# Patient Record
Sex: FEMALE | Race: WHITE | NOT HISPANIC OR LATINO | URBAN - METROPOLITAN AREA
[De-identification: names, ages, dates, MRNs, and addresses within clinical notes are randomized per-mention and may not be internally consistent; named-entity substitution may affect disease eponyms.]

---

## 2024-12-11 ENCOUNTER — APPOINTMENT (OUTPATIENT)
Dept: DERMATOLOGY | Facility: CLINIC | Age: 84
End: 2024-12-11
Payer: MEDICARE

## 2024-12-11 VITALS — SYSTOLIC BLOOD PRESSURE: 182 MMHG | HEART RATE: 79 BPM | DIASTOLIC BLOOD PRESSURE: 78 MMHG

## 2024-12-11 DIAGNOSIS — C44.91 BASAL CELL CARCINOMA (BCC), UNSPECIFIED SITE: ICD-10-CM

## 2024-12-11 PROCEDURE — 14301 TIS TRNFR ANY 30.1-60 SQ CM: CPT | Performed by: DERMATOLOGY

## 2024-12-11 PROCEDURE — 17312 MOHS ADDL STAGE: CPT | Performed by: DERMATOLOGY

## 2024-12-11 PROCEDURE — 17311 MOHS 1 STAGE H/N/HF/G: CPT | Performed by: DERMATOLOGY

## 2024-12-11 RX ORDER — OMEPRAZOLE 40 MG/1
40 CAPSULE, DELAYED RELEASE ORAL
COMMUNITY

## 2024-12-11 RX ORDER — DIVALPROEX SODIUM 250 MG/1
1 TABLET, DELAYED RELEASE ORAL
COMMUNITY
Start: 2024-11-26

## 2024-12-11 RX ORDER — FUROSEMIDE 20 MG/1
20 TABLET ORAL
COMMUNITY
Start: 2024-12-05 | End: 2025-06-03

## 2024-12-11 RX ORDER — LEVOTHYROXINE SODIUM 75 UG/1
TABLET ORAL
COMMUNITY

## 2024-12-11 RX ORDER — HYDRALAZINE HYDROCHLORIDE 50 MG/1
50 TABLET, FILM COATED ORAL 2 TIMES DAILY
COMMUNITY
Start: 2024-03-07

## 2024-12-11 RX ORDER — METOPROLOL SUCCINATE 50 MG/1
50 TABLET, EXTENDED RELEASE ORAL DAILY
COMMUNITY
Start: 2024-10-02

## 2024-12-11 RX ORDER — DILTIAZEM HYDROCHLORIDE 180 MG/1
180 CAPSULE, COATED, EXTENDED RELEASE ORAL DAILY
COMMUNITY
Start: 2024-10-02

## 2024-12-11 NOTE — LETTER
MOH's Provider/Referral Letter Treatment Plan    Patient: Mary Jo HERNANDEZ Sunday   YOB: 1940   Date of Visit: 12/11/2024   MRN: [id number]     Inez Rodriguez DO  [address]  [facility], [address]  [place] [address]    Dear Inez Rodriguez DO,     I had the pleasure of seeing Mary Jo Sunday today in consultation at your request for evaluation and treatment of:  1. Basal cell carcinoma (BCC), unspecified site  Nose    Mohs surgery    Staff Communication: Dermatology Local Anesthesia: 1 % Lidocaine / Epinephrine - Amount: 21cc 0.5% Sensorcaine - Amount: 9cc      Mohs surgery was indicated because of the nature of the lesion and the need to obtain the highest cure rate.  After informed consent was obtained, the patient underwent the procedure without complication.    The skin cancer was removed, wound care instructions were given and the patient was advised to follow up with you.  I will see the patient post-operatively as indicated.    Thank you very much for your confidence in me and for allowing me to share in the care of this patient.    1. Basal cell carcinoma (BCC), unspecified site  Nose  Is a 2 x 1.4 cm scar                                                          Mohs surgery    Consent obtained: written    Universal Protocol:  Procedure explained and questions answered to patient or proxy's satisfaction: Yes    Test results available and properly labeled: Yes    Pathology report reviewed: Yes    External notes reviewed: Yes    Photo or diagram used for site identification: Yes    Site/side marked: Yes    Slide independently reviewed by Mohs surgeon: Yes    Immediately prior to procedure a time out was called: Yes    Patient identity confirmed: verbally with patient  Preparation: Patient was prepped and draped in usual sterile fashion      Anticoagulation:  Is the patient taking prescription anticoagulant and/or aspirin prescribed/recommended by a physician? No    Was the anticoagulation  regimen changed prior to Mohs? No      Anesthesia:  Anesthesia method: local infiltration  Local anesthetic: lidocaine 1% WITH epi    Procedure Details:  Case ID Number: [id number]  Biopsy accession number: [id number]  Date of biopsy: 10/15/2024  Frozen section biopsy performed: No    Specimen debulked: Yes (nBCC)    Pre-Op diagnosis: basal cell carcinoma  BCC subtype: nodular and infiltrative (ulcerated)  Surgical site (from skin exam): Nose  Pre-operative length (cm): 2  Pre-operative width (cm): 1.4  Indications for Mohs surgery: anatomic location where tissue conservation is critical  Previously treated? No      Micrographic Surgery Details:  Post-operative length (cm): 2.5  Post-operative width (cm): 2.5  Number of Mohs stages: 2    Stage 1     Comments: The patient was brought into the operating room and placed in the procedure chair in the appropriate position.  The area positive by previous biopsy was identified and confirmed with the patient. The area of clinically obvious tumor was debulked using a curette and/or scalpel as needed. An incision was made following the Mohs approach through the skin. The specimen was taken to the lab, divided into 2 piece(s) and appropriately chromacoded and processed.    First piece clear on 1st slides, nBCC appearing in deeper second slide. Therefore, first piece clear, second piece tumor was seen on both the lateral and deep margins as indicated on the on the Mohs map.  Nodular basal cell carcinoma. Histologic examination revealed large tumor aggregates of atypical basaloid cells with peripheral palisading and tumoral clefting.              Tumor features identified on Mohs section: basal carcinoma    Stage 2     Comments: The area of positivity as noted on the Mohs map in the previous stage was identified and removed using the Mohs technique. The specimen was taken to the lab and appropriately chromacoded and processed in 1 piece(s).               Tumor features identified  on Mohs section: no tumor identified    Depth of defect: cartilage    Patient tolerance of procedure: tolerated well, no immediate complications    Reconstruction:  Was the defect reconstructed? Yes    Was reconstruction performed by the same Mohs surgeon? Yes    Setting of reconstruction: outpatient office  When was reconstruction performed? same day  Type of reconstruction: flap  Type of flap: transposition    Transposition flap type: melolabial  Flap area (cm2): 48  Subcutaneous Layers (Deep Stitches)   Suture size:  4-0 and 5-0  Suture type:  Vicryl  Stitches:  Buried vertical mattress  Fine/surface layer approximation (top stitches)   Epidermal/Superficial suture size:  5-0  Epidermal/Superficial suture type:  Fast-absorbing gut  Stitches: simple running    Hemostasis achieved with: electrodesiccation  Outcome: patient tolerated procedure well with no complications    Post-procedure details: sterile dressing applied and wound care instructions given    Dressing type: Hypafix, pressure dressing, petrolatum and Telfa pad      Staff Communication: Dermatology Local Anesthesia: 1 % Lidocaine / Epinephrine - Amount: 21cc 0.5% Sensorcaine - Amount: 9cc           Sincerely,       Irving Manzanares MD PhD  [facility]

## 2024-12-11 NOTE — PROGRESS NOTES
Office Visit Note  Date: 12/11/2024  Surgeon:  Irving Manzanares MD PhD  Office Location:  [address]  [facility] [address]  Dept: [phone]  Dept Fax: [phone]  Referring Provider: Inez Rodriguez,   [address]  [facility], [address]  [place] [address]    Estephanie Medina is a 84 y.o. female who presents for the following: MOHS Surgery (Nose, BCC)    According to the patient, the lesion has been present for approximately greater than 1 year at the time of diagnosis.  The lesion is itchy and painful.  The lesion has not been treated previously.    The patient does not have a pacemaker / defibrillator.  The patient does not have a heart valve / joint replacement.    The patient is not on blood thinners.  The patient does not have a history of hepatitis B or C.  The patient does not have a history of HIV.  The patient does not have a history of immunosuppression (e.g. organ transplantation, malignancy, medications)    Review of Systems:  No other skin or systemic complaints other than what is documented elsewhere in the note.    MEDICAL HISTORY: clinically relevant history including significant past medical history, medications and allergies was reviewed and documented in Epic.    Objective   Well appearing patient in no apparent distress; mood and affect are within normal limits.  Vital signs: See record.  Noted on the Nose  Is a 2 x 1.4 cm scar        The patient confirmed the identified site.    Discussion:  The nature of the diagnosis was explained. The lesion is a skin cancer.  It has a risk of local growth and distant spread. The condition is associated with sun exposure.  Warning signs of non-melanoma skin cancer discussed. Patient was instructed to perform monthly self skin examination.  We recommended that the patient have regular full skin exams given an increased risk of subsequent skin cancers. The patient was  instructed to use sun protective behaviors including use of broad spectrum sunscreens and sun protective clothing to reduce risk of skin cancers.      Risks, benefits, side effects of Mohs surgery were discussed with patient and the patient voiced understanding.  It was explained that even though the cure rate of Mohs is very high it is not 100%. Risks of surgery including but not limited to bleeding, infection, numbness, nerve damage, and scar were reviewed.  Discussion included wound care requirements, activity restrictions, likely scar outcome and time to heal.     After Mohs surgery, the defect may need to be repaired surgically and the scar may be longer than the original lesion.  Reconstruction options, risks, and benefits were reviewed including second intention healing, linear repair (4-1 ratio was explained), local flaps, skin grafts, cartilage grafts and interpolation flaps (the need for multiple surgeries was explained). Possible outcomes were reviewed including likely scar appearance, failure of flap survival, infection, bleeding and the need for revision surgery.     The pathology was reviewed, the photograph was reviewed, and the referring physician's note was reviewed.    Patient elected for Mohs surgery.

## 2024-12-11 NOTE — PROGRESS NOTES
Mohs Surgery Operative Note    Date of Surgery:  12/11/2024  Surgeon:  Irving Manzanares MD PhD  Office Location:  [address]  [facility] [address]  Dept: [phone]  Dept Fax: [phone]  Referring Provider: Inez Rodriguez, DO  [address]  [facility], [address]  [place] [address]      Assessment/Plan   Pre-procedure:   Obtained informed consent: written from patient  The surgical site was identified and confirmed with the patient.     Intra-operative:   Audible time out called at : 3:54 PM 12/11/24  by: Coby Meyers RN   Verified patient name, birthdate, site, specimen bottle label & requisition.    The planned procedure(s) was again reviewed with the patient. The risks of bleeding, infection, nerve damage and scarring were reviewed. Written authorization was obtained. The patient identity, surgical site, and planned procedure(s) were verified. The provider acted as both surgeon and pathologist.     Basal cell carcinoma (BCC), unspecified site  Nose    Mohs surgery    Consent obtained: written    Universal Protocol:  Procedure explained and questions answered to patient or proxy's satisfaction: Yes    Test results available and properly labeled: Yes    Pathology report reviewed: Yes    External notes reviewed: Yes    Photo or diagram used for site identification: Yes    Site/side marked: Yes    Slide independently reviewed by Mohs surgeon: Yes    Immediately prior to procedure a time out was called: Yes    Patient identity confirmed: verbally with patient  Preparation: Patient was prepped and draped in usual sterile fashion      Anticoagulation:  Is the patient taking prescription anticoagulant and/or aspirin prescribed/recommended by a physician? No    Was the anticoagulation regimen changed prior to Mohs? No      Anesthesia:  Anesthesia method: local infiltration  Local anesthetic: lidocaine 1% WITH epi    Procedure Details:  Case ID Number:  [id number]  Biopsy accession number: [id number]  Date of biopsy: 10/15/2024  Frozen section biopsy performed: No    Specimen debulked: Yes (nBCC)    Pre-Op diagnosis: basal cell carcinoma  BCC subtype: nodular and infiltrative (ulcerated)  Surgical site (from skin exam): Nose  Pre-operative length (cm): 2  Pre-operative width (cm): 1.4  Indications for Mohs surgery: anatomic location where tissue conservation is critical  Previously treated? No      Micrographic Surgery Details:  Post-operative length (cm): 2.5  Post-operative width (cm): 2.5  Number of Mohs stages: 2    Stage 1     Comments: The patient was brought into the operating room and placed in the procedure chair in the appropriate position.  The area positive by previous biopsy was identified and confirmed with the patient. The area of clinically obvious tumor was debulked using a curette and/or scalpel as needed. An incision was made following the Mohs approach through the skin. The specimen was taken to the lab, divided into 2 piece(s) and appropriately chromacoded and processed.    First piece clear on 1st slides, nBCC appearing in deeper second slide. Therefore, first piece clear, second piece tumor was seen on both the lateral and deep margins as indicated on the on the Mohs map.  Nodular basal cell carcinoma. Histologic examination revealed large tumor aggregates of atypical basaloid cells with peripheral palisading and tumoral clefting.              Tumor features identified on Mohs section: basal carcinoma    Stage 2     Comments: The area of positivity as noted on the Mohs map in the previous stage was identified and removed using the Mohs technique. The specimen was taken to the lab and appropriately chromacoded and processed in 1 piece(s).               Tumor features identified on Mohs section: no tumor identified    Depth of defect: cartilage    Patient tolerance of procedure: tolerated well, no immediate complications    Reconstruction:  Was  the defect reconstructed? Yes    Was reconstruction performed by the same Mohs surgeon? Yes    Setting of reconstruction: outpatient office  When was reconstruction performed? same day  Type of reconstruction: flap  Type of flap: transposition    Transposition flap type: melolabial  Flap area (cm2): 48  Subcutaneous Layers (Deep Stitches)   Suture size:  4-0 and 5-0  Suture type:  Vicryl  Stitches:  Buried vertical mattress  Fine/surface layer approximation (top stitches)   Epidermal/Superficial suture size:  5-0  Epidermal/Superficial suture type:  Fast-absorbing gut  Stitches: simple running    Hemostasis achieved with: electrodesiccation  Outcome: patient tolerated procedure well with no complications    Post-procedure details: sterile dressing applied and wound care instructions given    Dressing type: Hypafix, pressure dressing, petrolatum and Telfa pad                  Transposition Flap:  Due to geometric and functional constraints, a flap reconstruction was performed to reconstruct the defect. To that end, adjacent tissue was incised and carried over to close the defect in the following manner: Transposition flap Using skin marking ink, a transposition flap was designed to repair the defect and minimize functional and cosmetic distortion. Given the size, depth, and location of the defect, the surrounding structures and local tissue laxity, it was felt that a transposition flap was necessary to provide the best restoration of normal anatomy and function of the skin. The risks, benefits and likely outcome of the flap were discussed. The wound edges were refreshed to a 90 degree angle. The flap was cut and undermined extensively at the level of the subcutaneous plane. Standing cutaneous cones were removed using Burow's triangles. The flap was elevated and through closure of the secondary/tertiary defect the flap was transposed into the primary defect using multiple key deep sutures. The flap was trimmed where  needed for a more accurate fit. The remainder of the flap was then affixed with epidermal sutures. The flap measured 48 cm2.       Wound care was discussed, and the patient was given written post-operative wound care instructions.      The patient will follow up with Irving Manzanares MD PhD as needed for any post operative problems or concerns, and will follow up with their primary dermatologist as scheduled.

## 2024-12-17 ENCOUNTER — APPOINTMENT (OUTPATIENT)
Dept: DERMATOLOGY | Facility: CLINIC | Age: 84
End: 2024-12-17
Payer: MEDICARE

## 2024-12-17 DIAGNOSIS — Z51.89 VISIT FOR WOUND CHECK: ICD-10-CM

## 2024-12-17 PROCEDURE — 1157F ADVNC CARE PLAN IN RCRD: CPT | Performed by: DERMATOLOGY

## 2024-12-17 PROCEDURE — 99024 POSTOP FOLLOW-UP VISIT: CPT | Performed by: DERMATOLOGY

## 2024-12-17 NOTE — PROGRESS NOTES
Estephanie Medina is a 84 y.o. female who presents for the following: Wound Check (1 week s/p mohs on nose for BCC). She reports that Tylenol helps with occasional pain. She has not had any persistent bleeding or drainage.     Review of Systems:  No other skin or systemic complaints other than what is documented elsewhere in the note.    The following portions of the chart were reviewed this encounter and updated as appropriate:          Skin Cancer History  No skin cancer on file.      Specialty Problems    None       Objective   Well appearing patient in no apparent distress; mood and affect are within normal limits.    A focused skin examination was performed. All findings within normal limits unless otherwise noted below.    Assessment/Plan   1. Visit for wound check  Nose  Swelling of suture sites with erythema and bruising of the right face. Some superficial slough at suture line. No tenderness and good wound edge apposition.                          + S/p Mohs for BCC of nose  ++ Reviewed that we expect the discoloration to improve and the swelling to go down.  ++ The melolabial suture line will likely be the first to disappear, followed by the suture line on the inferior nose and superior nose.  ++ Discussed that the bulkiness was left in place in order to support the structure of the nose best. In the future, if interventions are needed to reduce the bulkiness, this can be done.  ++ Clean dressing reapplied today. Patient counseled to continue dressing the site.  ++ Follow up in 6 weeks    I saw and evaluated the patient, reviewed the resident's notes and discussed the patient's managment.  I agree with the documented findings and plan of care.   Irving Manzanares MD, PhD

## 2025-01-28 ENCOUNTER — APPOINTMENT (OUTPATIENT)
Dept: DERMATOLOGY | Facility: CLINIC | Age: 85
End: 2025-01-28
Payer: MEDICARE

## 2025-01-28 DIAGNOSIS — Z51.89 VISIT FOR WOUND CHECK: ICD-10-CM

## 2025-01-28 PROCEDURE — 1157F ADVNC CARE PLAN IN RCRD: CPT | Performed by: DERMATOLOGY

## 2025-01-28 PROCEDURE — 99024 POSTOP FOLLOW-UP VISIT: CPT | Performed by: DERMATOLOGY

## 2025-01-28 NOTE — PROGRESS NOTES
Office Follow Up Note    Visit Summary  Chief Complaint    1. Complaint Wound check.    Mary Jo Medina is a 84 y.o. female who presents for 6 week follow up after surgery for a basal cell carcinoma. The patient has no concerns today.     Location Operation site location: nose              On exam,  Ms. Medina is well-appearing and in no apparent distress. The surgical site appears clean with no erythema. No tenderness and good wound edge apposition.    Assessment and Plan:  The patient was reassured that the wound is healing appropriately.   Discussed fullness at tip of nose and suture line running down the bridge.  Patient numbed with local lidocaine then given 0.2ml of 40 mg/ml of intralesional kenalog.  Pt will return in 2 months and at that time we can discuss results of ILK and possible dermabrasion at this time as well.     The patient was instructed to call with any further concerns. The patient will return in 8 weeks.    Irving Manzanares MD, PhD

## 2025-03-25 ENCOUNTER — APPOINTMENT (OUTPATIENT)
Dept: DERMATOLOGY | Facility: CLINIC | Age: 85
End: 2025-03-25
Payer: MEDICARE

## 2025-03-25 DIAGNOSIS — Z51.89 VISIT FOR WOUND CHECK: ICD-10-CM

## 2025-03-25 PROCEDURE — 1159F MED LIST DOCD IN RCRD: CPT | Performed by: DERMATOLOGY

## 2025-03-25 PROCEDURE — 99024 POSTOP FOLLOW-UP VISIT: CPT | Performed by: DERMATOLOGY

## 2025-03-25 PROCEDURE — 1157F ADVNC CARE PLAN IN RCRD: CPT | Performed by: DERMATOLOGY

## 2025-03-25 RX ORDER — EZETIMIBE 10 MG/1
1 TABLET ORAL
COMMUNITY
Start: 2024-12-18

## 2025-03-25 NOTE — PROGRESS NOTES
Office Follow Up Note    Visit Summary  Chief Complaint    1. Complaint Wound check.    Mary Jo Medina is a 84 y.o. female who presents for 8 week follow up after surgery for a basal cell carcinoma. The patient has no concerns today.  At last clinic visit, she had 0.2cc of ILK40 injected to the tip of the nose (1/28/25). She notes improvement in this area today.    Location Operation site location: Right nose    On exam,  Ms. Medina is well-appearing and in no apparent distress. The surgical site appears clean with minimal to no erythema. No tenderness and good wound edge apposition.    Assessment and Plan:  History of skin cancer requiring ongoing monitoring for recurrence and additional lesion development.   The patient was reassured that the wound is healing appropriately.     The patient was advised on the importance of routine skin monitoring including follow up with general dermatology and instructed to call with any further concerns. The patient will return in 3 months for post op wound check.                    Holly Fontanez MD, JERILYN  PGY-3, Department of Dermatology    I saw and evaluated the patient, reviewed the resident's notes and discussed the patient's managment.  I agree with the documented findings and plan of care.   Irving Manzanares MD, PhD

## 2025-06-25 ENCOUNTER — APPOINTMENT (OUTPATIENT)
Dept: DERMATOLOGY | Facility: CLINIC | Age: 85
End: 2025-06-25
Payer: MEDICARE